# Patient Record
Sex: MALE | Race: WHITE | NOT HISPANIC OR LATINO
[De-identification: names, ages, dates, MRNs, and addresses within clinical notes are randomized per-mention and may not be internally consistent; named-entity substitution may affect disease eponyms.]

---

## 2017-01-23 ENCOUNTER — APPOINTMENT (OUTPATIENT)
Dept: VASCULAR SURGERY | Facility: CLINIC | Age: 82
End: 2017-01-23

## 2017-01-23 VITALS
DIASTOLIC BLOOD PRESSURE: 66 MMHG | TEMPERATURE: 98 F | HEART RATE: 68 BPM | SYSTOLIC BLOOD PRESSURE: 103 MMHG | HEIGHT: 67 IN | BODY MASS INDEX: 28.25 KG/M2 | WEIGHT: 180 LBS

## 2017-03-30 ENCOUNTER — APPOINTMENT (OUTPATIENT)
Dept: PULMONOLOGY | Facility: CLINIC | Age: 82
End: 2017-03-30

## 2017-03-30 VITALS
RESPIRATION RATE: 17 BRPM | BODY MASS INDEX: 28.25 KG/M2 | DIASTOLIC BLOOD PRESSURE: 70 MMHG | HEIGHT: 67 IN | WEIGHT: 180 LBS | HEART RATE: 89 BPM | SYSTOLIC BLOOD PRESSURE: 110 MMHG | OXYGEN SATURATION: 97 %

## 2017-03-30 DIAGNOSIS — Z86.711 PERSONAL HISTORY OF PULMONARY EMBOLISM: ICD-10-CM

## 2017-03-30 RX ORDER — UMECLIDINIUM 62.5 UG/1
62.5 AEROSOL, POWDER ORAL
Qty: 3 | Refills: 1 | Status: ACTIVE | COMMUNITY
Start: 2017-03-30 | End: 1900-01-01

## 2017-03-30 RX ORDER — BUDESONIDE 0.5 MG/2ML
0.5 INHALANT ORAL TWICE DAILY
Qty: 60 | Refills: 4 | Status: ACTIVE | COMMUNITY
Start: 2017-03-30 | End: 1900-01-01

## 2017-03-30 RX ORDER — IPRATROPIUM BROMIDE AND ALBUTEROL SULFATE 2.5; .5 MG/3ML; MG/3ML
0.5-2.5 (3) SOLUTION RESPIRATORY (INHALATION) 4 TIMES DAILY
Qty: 360 | Refills: 2 | Status: ACTIVE | COMMUNITY
Start: 2017-03-30 | End: 1900-01-01

## 2017-05-16 ENCOUNTER — APPOINTMENT (OUTPATIENT)
Dept: SURGICAL ONCOLOGY | Facility: CLINIC | Age: 82
End: 2017-05-16

## 2017-05-16 VITALS — DIASTOLIC BLOOD PRESSURE: 76 MMHG | SYSTOLIC BLOOD PRESSURE: 114 MMHG | HEIGHT: 67 IN

## 2017-05-16 DIAGNOSIS — Z87.01 PERSONAL HISTORY OF PNEUMONIA (RECURRENT): ICD-10-CM

## 2017-05-16 DIAGNOSIS — Z86.79 PERSONAL HISTORY OF OTHER DISEASES OF THE CIRCULATORY SYSTEM: ICD-10-CM

## 2017-05-16 DIAGNOSIS — Z87.448 PERSONAL HISTORY OF OTHER DISEASES OF URINARY SYSTEM: ICD-10-CM

## 2017-05-25 ENCOUNTER — NON-APPOINTMENT (OUTPATIENT)
Age: 82
End: 2017-05-25

## 2017-05-25 ENCOUNTER — APPOINTMENT (OUTPATIENT)
Dept: CARDIOLOGY | Facility: CLINIC | Age: 82
End: 2017-05-25

## 2017-05-25 VITALS — HEART RATE: 80 BPM | DIASTOLIC BLOOD PRESSURE: 81 MMHG | SYSTOLIC BLOOD PRESSURE: 126 MMHG

## 2017-05-26 ENCOUNTER — RESULT REVIEW (OUTPATIENT)
Age: 82
End: 2017-05-26

## 2017-05-26 ENCOUNTER — OUTPATIENT (OUTPATIENT)
Dept: OUTPATIENT SERVICES | Facility: HOSPITAL | Age: 82
LOS: 1 days | End: 2017-05-26
Payer: MEDICARE

## 2017-05-26 DIAGNOSIS — C44.622 SQUAMOUS CELL CARCINOMA OF SKIN OF RIGHT UPPER LIMB, INCLUDING SHOULDER: ICD-10-CM

## 2017-05-26 PROCEDURE — 88321 CONSLTJ&REPRT SLD PREP ELSWR: CPT

## 2017-05-27 LAB — SURGICAL PATHOLOGY STUDY: SIGNIFICANT CHANGE UP

## 2017-05-30 ENCOUNTER — APPOINTMENT (OUTPATIENT)
Dept: VASCULAR SURGERY | Facility: CLINIC | Age: 82
End: 2017-05-30

## 2017-05-30 VITALS
HEIGHT: 67 IN | BODY MASS INDEX: 27.47 KG/M2 | DIASTOLIC BLOOD PRESSURE: 64 MMHG | WEIGHT: 175 LBS | TEMPERATURE: 98.3 F | SYSTOLIC BLOOD PRESSURE: 94 MMHG | HEART RATE: 76 BPM

## 2017-05-31 ENCOUNTER — FORM ENCOUNTER (OUTPATIENT)
Age: 82
End: 2017-05-31

## 2017-06-01 ENCOUNTER — OUTPATIENT (OUTPATIENT)
Dept: OUTPATIENT SERVICES | Facility: HOSPITAL | Age: 82
LOS: 1 days | End: 2017-06-01
Payer: MEDICARE

## 2017-06-01 ENCOUNTER — APPOINTMENT (OUTPATIENT)
Dept: CV DIAGNOSITCS | Facility: HOSPITAL | Age: 82
End: 2017-06-01

## 2017-06-01 VITALS
TEMPERATURE: 98 F | HEIGHT: 67 IN | OXYGEN SATURATION: 97 % | RESPIRATION RATE: 16 BRPM | SYSTOLIC BLOOD PRESSURE: 130 MMHG | WEIGHT: 173.94 LBS | HEART RATE: 84 BPM | DIASTOLIC BLOOD PRESSURE: 76 MMHG

## 2017-06-01 DIAGNOSIS — I77.0 ARTERIOVENOUS FISTULA, ACQUIRED: Chronic | ICD-10-CM

## 2017-06-01 DIAGNOSIS — Z01.818 ENCOUNTER FOR OTHER PREPROCEDURAL EXAMINATION: ICD-10-CM

## 2017-06-01 DIAGNOSIS — I42.9 CARDIOMYOPATHY, UNSPECIFIED: ICD-10-CM

## 2017-06-01 DIAGNOSIS — I48.91 UNSPECIFIED ATRIAL FIBRILLATION: ICD-10-CM

## 2017-06-01 DIAGNOSIS — C44.621 SQUAMOUS CELL CARCINOMA OF SKIN OF UNSPECIFIED UPPER LIMB, INCLUDING SHOULDER: ICD-10-CM

## 2017-06-01 DIAGNOSIS — C44.320 SQUAMOUS CELL CARCINOMA OF SKIN OF UNSPECIFIED PARTS OF FACE: ICD-10-CM

## 2017-06-01 DIAGNOSIS — R00.2 PALPITATIONS: ICD-10-CM

## 2017-06-01 DIAGNOSIS — C44.90 UNSPECIFIED MALIGNANT NEOPLASM OF SKIN, UNSPECIFIED: ICD-10-CM

## 2017-06-01 LAB
ANION GAP SERPL CALC-SCNC: 18 MMOL/L — HIGH (ref 5–17)
BUN SERPL-MCNC: 57 MG/DL — HIGH (ref 7–23)
CALCIUM SERPL-MCNC: 9.7 MG/DL — SIGNIFICANT CHANGE UP (ref 8.4–10.5)
CHLORIDE SERPL-SCNC: 96 MMOL/L — SIGNIFICANT CHANGE UP (ref 96–108)
CO2 SERPL-SCNC: 26 MMOL/L — SIGNIFICANT CHANGE UP (ref 22–31)
CREAT SERPL-MCNC: 4.17 MG/DL — HIGH (ref 0.5–1.3)
GLUCOSE SERPL-MCNC: 109 MG/DL — HIGH (ref 70–99)
HCT VFR BLD CALC: 35.4 % — LOW (ref 39–50)
HGB BLD-MCNC: 11.2 G/DL — LOW (ref 13–17)
INR BLD: 2.06 RATIO — HIGH (ref 0.88–1.16)
MCHC RBC-ENTMCNC: 31.2 PG — SIGNIFICANT CHANGE UP (ref 27–34)
MCHC RBC-ENTMCNC: 31.6 GM/DL — LOW (ref 32–36)
MCV RBC AUTO: 98.6 FL — SIGNIFICANT CHANGE UP (ref 80–100)
PLATELET # BLD AUTO: 211 K/UL — SIGNIFICANT CHANGE UP (ref 150–400)
POTASSIUM SERPL-MCNC: 3.8 MMOL/L — SIGNIFICANT CHANGE UP (ref 3.5–5.3)
POTASSIUM SERPL-SCNC: 3.8 MMOL/L — SIGNIFICANT CHANGE UP (ref 3.5–5.3)
PROTHROM AB SERPL-ACNC: 23.6 SEC — HIGH (ref 10–13.1)
RBC # BLD: 3.59 M/UL — LOW (ref 4.2–5.8)
RBC # FLD: 17.8 % — HIGH (ref 10.3–14.5)
SODIUM SERPL-SCNC: 140 MMOL/L — SIGNIFICANT CHANGE UP (ref 135–145)
WBC # BLD: 5.8 K/UL — SIGNIFICANT CHANGE UP (ref 3.8–10.5)
WBC # FLD AUTO: 5.8 K/UL — SIGNIFICANT CHANGE UP (ref 3.8–10.5)

## 2017-06-01 PROCEDURE — 71046 X-RAY EXAM CHEST 2 VIEWS: CPT

## 2017-06-01 PROCEDURE — G0463: CPT

## 2017-06-01 PROCEDURE — 93306 TTE W/DOPPLER COMPLETE: CPT | Mod: 26

## 2017-06-01 PROCEDURE — 71020: CPT | Mod: 26

## 2017-06-01 PROCEDURE — 80048 BASIC METABOLIC PNL TOTAL CA: CPT

## 2017-06-01 PROCEDURE — 85610 PROTHROMBIN TIME: CPT

## 2017-06-01 PROCEDURE — 93306 TTE W/DOPPLER COMPLETE: CPT

## 2017-06-01 PROCEDURE — 85027 COMPLETE CBC AUTOMATED: CPT

## 2017-06-01 NOTE — H&P PST ADULT - HISTORY OF PRESENT ILLNESS
NIGHT HOSPITALIST:    Patient UNKNOWN to me previously, assigned to me via the ER and by Dr. Wang to admit this 80 y/o M--patient seen with granddaughter and family in attendance--patient with a history of chronic kidney disease, heart failure with reduced ejection fraction, with recent discharge from [facility], S/P bladder clot evacuation by Dr. Zimmer with the last admission, with the patient brought in by EMS from the SNF following progressive increased swelling of the LEFT>right LE and decreased functional status at rehab.  No chest pain/pressure.  No dyspnea.  Patient with non-productive cough.  No fever, no chills, no rigors.  No abdominal pain, no red blood per rectum or melena.  No back pain, no tearing back pain.  No anorexia.  No HA, no focal weakness.  No hematuria, no dysuria.  Remaining review of systems not contributory. 82 year old male with PMH of Afib on warfarin, DVT,PE, IVC filter placed, Non ischemic cardiomyopathy, CHF;systolic diastolic dysfunction, ESRD on HD 3X/week M/W/F, COPD, Asbestosis & SCC of fore head> scheduled for wide excision on 06/13/17.

## 2017-06-01 NOTE — H&P PST ADULT - PROBLEM SELECTOR PLAN 1
Lasix 40 mg IVP daily as above--additional doses as needed by patient's symptoms.  Daily weights.  Admitted to telemetry.  Serial cardiac enzymes.  Echo.  Heart failure service consultation in the AM.  On Coreg. WIDE EXCISION SQUAMOUS CELL CARCINOMA RIGHT & LEFT FORHEAD

## 2017-06-01 NOTE — H&P PST ADULT - PMH
Afib    Asthma    Cardiomyopathy    Chronic obstructive pulmonary disease    DVT (deep venous thrombosis)    Dyspnea    Former smoker    Obstructive sleep apnea    Pulmonary embolism    SDH (subdural hematoma) Afib    Asthma    Cardiomyopathy    Chronic obstructive pulmonary disease    DVT (deep venous thrombosis)    Dyspnea    Former smoker    Obstructive sleep apnea    Pulmonary embolism    SDH (subdural hematoma)    Skin cancer

## 2017-06-01 NOTE — H&P PST ADULT - NSANTHOSAYNRD_GEN_A_CORE
No. DEEPALI screening performed.  STOP BANG Legend: 0-2 = LOW Risk; 3-4 = INTERMEDIATE Risk; 5-8 = HIGH Risk

## 2017-06-01 NOTE — H&P PST ADULT - PSH
Afib    H/O right inguinal hernia repair    PE (pulmonary embolism)    Presence of IVC filter    S/P nasal septoplasty Afib    AVF (arteriovenous fistula)  right AV fistula  H/O right inguinal hernia repair    PE (pulmonary embolism)    Presence of IVC filter    S/P nasal septoplasty

## 2017-06-01 NOTE — H&P PST ADULT - PROBLEM SELECTOR PLAN 3
case discussed with Dr chairez & Dr Cornelius-Case need to done in [facility]  Informed surgeo's office to schedule in [facility](Ms Whelan)

## 2017-06-01 NOTE — H&P PST ADULT - PROBLEM SELECTOR PLAN 2
Coumadin- pre op plan as per cardiologist-  Patient & his home nurse" will confirm it with the cardiologist"

## 2017-06-02 ENCOUNTER — OUTPATIENT (OUTPATIENT)
Dept: OUTPATIENT SERVICES | Facility: HOSPITAL | Age: 82
LOS: 1 days | End: 2017-06-02
Payer: MEDICARE

## 2017-06-02 ENCOUNTER — APPOINTMENT (OUTPATIENT)
Dept: ULTRASOUND IMAGING | Facility: CLINIC | Age: 82
End: 2017-06-02

## 2017-06-02 DIAGNOSIS — Z00.8 ENCOUNTER FOR OTHER GENERAL EXAMINATION: ICD-10-CM

## 2017-06-02 DIAGNOSIS — I77.0 ARTERIOVENOUS FISTULA, ACQUIRED: Chronic | ICD-10-CM

## 2017-06-02 PROCEDURE — 76775 US EXAM ABDO BACK WALL LIM: CPT

## 2017-06-05 ENCOUNTER — APPOINTMENT (OUTPATIENT)
Dept: VASCULAR SURGERY | Facility: CLINIC | Age: 82
End: 2017-06-05

## 2017-06-12 NOTE — ASU PATIENT PROFILE, ADULT - PSH
Afib    AVF (arteriovenous fistula)  right AV fistula  H/O right inguinal hernia repair    PE (pulmonary embolism)    Presence of IVC filter    S/P nasal septoplasty

## 2017-06-12 NOTE — ASU PATIENT PROFILE, ADULT - PMH
Afib    Asthma    Cardiomyopathy    Chronic obstructive pulmonary disease    DVT (deep venous thrombosis)    Dyspnea    Former smoker    Obstructive sleep apnea    Pulmonary embolism    SDH (subdural hematoma)    Skin cancer

## 2017-06-13 ENCOUNTER — OUTPATIENT (OUTPATIENT)
Dept: OUTPATIENT SERVICES | Facility: HOSPITAL | Age: 82
LOS: 1 days | Discharge: HOME | End: 2017-06-13
Payer: MEDICARE

## 2017-06-13 ENCOUNTER — RESULT REVIEW (OUTPATIENT)
Age: 82
End: 2017-06-13

## 2017-06-13 ENCOUNTER — APPOINTMENT (OUTPATIENT)
Dept: SURGICAL ONCOLOGY | Facility: HOSPITAL | Age: 82
End: 2017-06-13

## 2017-06-13 VITALS
HEIGHT: 67 IN | HEART RATE: 90 BPM | TEMPERATURE: 98 F | SYSTOLIC BLOOD PRESSURE: 102 MMHG | RESPIRATION RATE: 16 BRPM | DIASTOLIC BLOOD PRESSURE: 69 MMHG | OXYGEN SATURATION: 96 % | WEIGHT: 173.94 LBS

## 2017-06-13 VITALS
RESPIRATION RATE: 16 BRPM | DIASTOLIC BLOOD PRESSURE: 60 MMHG | SYSTOLIC BLOOD PRESSURE: 107 MMHG | OXYGEN SATURATION: 98 % | HEART RATE: 72 BPM

## 2017-06-13 DIAGNOSIS — C44.629 SQUAMOUS CELL CARCINOMA OF SKIN OF LEFT UPPER LIMB, INCLUDING SHOULDER: ICD-10-CM

## 2017-06-13 DIAGNOSIS — I77.0 ARTERIOVENOUS FISTULA, ACQUIRED: Chronic | ICD-10-CM

## 2017-06-13 LAB
APTT BLD: 38 SEC — HIGH (ref 27.5–37.4)
BASE EXCESS BLDA CALC-SCNC: 0.5 MMOL/L — SIGNIFICANT CHANGE UP
CA-I BLDA-SCNC: 1.18 MMOL/L — SIGNIFICANT CHANGE UP (ref 1.15–1.29)
GLUCOSE BLDA-MCNC: 101 MG/DL — HIGH (ref 70–99)
HCO3 BLDA-SCNC: 25 MMOL/L — SIGNIFICANT CHANGE UP (ref 22–26)
HCT VFR BLDA CALC: 34.1 % — LOW (ref 39–51)
HGB BLDA-MCNC: 11.1 G/DL — LOW (ref 13–17)
INR BLD: 1.15 — SIGNIFICANT CHANGE UP (ref 0.88–1.17)
PCO2 BLDA: 38 MMHG — SIGNIFICANT CHANGE UP (ref 35–48)
PH BLDA: 7.42 PH — SIGNIFICANT CHANGE UP (ref 7.35–7.45)
PO2 BLDA: 367 MMHG — HIGH (ref 83–108)
POTASSIUM BLDA-SCNC: 4 MMOL/L — SIGNIFICANT CHANGE UP (ref 3.4–4.5)
POTASSIUM BLDV-SCNC: 3.9 MMOL/L — SIGNIFICANT CHANGE UP (ref 3.4–4.5)
PROTHROM AB SERPL-ACNC: 12.9 SEC — SIGNIFICANT CHANGE UP (ref 9.8–13.1)
SAO2 % BLDA: 100 % — HIGH (ref 95–99)
SODIUM BLDA-SCNC: 140 MMOL/L — SIGNIFICANT CHANGE UP (ref 136–146)

## 2017-06-13 PROCEDURE — 88331 PATH CONSLTJ SURG 1 BLK 1SPC: CPT | Mod: 26

## 2017-06-13 PROCEDURE — 88305 TISSUE EXAM BY PATHOLOGIST: CPT | Mod: 26

## 2017-06-13 PROCEDURE — 88332 PATH CONSLTJ SURG EA ADD BLK: CPT | Mod: 26

## 2017-06-13 RX ORDER — SODIUM CHLORIDE 9 MG/ML
1000 INJECTION, SOLUTION INTRAVENOUS
Qty: 0 | Refills: 0 | Status: DISCONTINUED | OUTPATIENT
Start: 2017-06-13 | End: 2017-06-28

## 2017-06-13 NOTE — ASU DISCHARGE PLAN (ADULT/PEDIATRIC). - ITEMS TO FOLLOWUP WITH YOUR PHYSICIAN'S
1. Please follow-up with plastic surgeon Dr. Hamilton in 1 week.  Please call his office at [phone] to make an appointment.  2. Please follow-up with your surgeon Dr. Jerez in 10 days.  Please call his office to make an appointment

## 2017-06-13 NOTE — ASU DISCHARGE PLAN (ADULT/PEDIATRIC). - MEDICATION SUMMARY - MEDICATIONS TO TAKE
I will START or STAY ON the medications listed below when I get home from the hospital:    finasteride 5 mg oral tablet  -- 1 tab(s) by mouth once a day  -- Indication: For BPH    budesonide 0.5 mg/2 mL inhalation suspension  -- 2 milliliter(s) inhaled 2 times a day  -- Indication: For asthma    Coumadin 5 mg oral tablet  -- 1 tab(s) by mouth once a day in evening  -- Do not take this drug if you are pregnant.  It is very important that you take or use this exactly as directed.  Do not skip doses or discontinue unless directed by your doctor.  Obtain medical advice before taking any non-prescription drugs as some may affect the action of this medication.    -- Indication: For PE    Toprol-XL 25 mg oral tablet, extended release  -- 0.5 tab(s) = 12.5 mg  by mouth once a day  -- It is very important that you take or use this exactly as directed.  Do not skip doses or discontinue unless directed by your doctor.  May cause drowsiness.  Alcohol may intensify this effect.  Use care when operating dangerous machinery.  Some non-prescription drugs may aggravate your condition.  Read all labels carefully.  If a warning appears, check with your doctor before taking.  Swallow whole.  Do not crush.  Take with food or milk.  This drug may impair the ability to drive or operate machinery.  Use care until you become familiar with its effects.    -- Indication: For a-fib    albuterol-ipratropium 2.5 mg-0.5 mg/3 mL inhalation solution  -- 3 milliliter(s) inhaled every 6 hours  -- Indication: For asthma    Spiriva 18 mcg inhalation capsule  -- 1 cap(s) inhaled once a day  -- Indication: For asthma    epoetin chong  -- 61674 unit(s) intravenous 3 times a week with dialysis on T/Th/Sat  -- Indication: For dialysis    montelukast 10 mg oral tablet  -- 1 tab(s) by mouth once a day (in the evening)  -- Indication: For asthma    sevelamer hydrochloride 800 mg oral tablet  -- 2 tab(s) = 1600 mg by mouth 3 times a day (with meals)  -- Indication: For home medication I will START or STAY ON the medications listed below when I get home from the hospital:    finasteride 5 mg oral tablet  -- 1 tab(s) by mouth once a day  -- Indication: For BPH    budesonide 0.5 mg/2 mL inhalation suspension  -- 2 milliliter(s) inhaled 2 times a day  -- Indication: For COPD    Coumadin 5 mg oral tablet  -- 1 tab(s) by mouth once a day in evening  -- Do not take this drug if you are pregnant.  It is very important that you take or use this exactly as directed.  Do not skip doses or discontinue unless directed by your doctor.  Obtain medical advice before taking any non-prescription drugs as some may affect the action of this medication.    -- Indication: For PE    Toprol-XL 25 mg oral tablet, extended release  -- 0.5 tab(s) = 12.5 mg  by mouth once a day  -- It is very important that you take or use this exactly as directed.  Do not skip doses or discontinue unless directed by your doctor.  May cause drowsiness.  Alcohol may intensify this effect.  Use care when operating dangerous machinery.  Some non-prescription drugs may aggravate your condition.  Read all labels carefully.  If a warning appears, check with your doctor before taking.  Swallow whole.  Do not crush.  Take with food or milk.  This drug may impair the ability to drive or operate machinery.  Use care until you become familiar with its effects.    -- Indication: For a-fib    albuterol-ipratropium 2.5 mg-0.5 mg/3 mL inhalation solution  -- 3 milliliter(s) inhaled every 6 hours  -- Indication: For COPD    Spiriva 18 mcg inhalation capsule  -- 1 cap(s) inhaled once a day  -- Indication: For COPD    epoetin chong  -- 42981 unit(s) intravenous 3 times a week with dialysis on T/Th/Sat  -- Indication: For dialysis    montelukast 10 mg oral tablet  -- 1 tab(s) by mouth once a day (in the evening)  -- Indication: For COPD    sevelamer hydrochloride 800 mg oral tablet  -- 2 tab(s) = 1600 mg by mouth 3 times a day (with meals)  -- Indication: For home medication

## 2017-06-13 NOTE — BRIEF OPERATIVE NOTE - OPERATION/FINDINGS
PROCEDURE  Solitario:  Wide local excision of Left forehead, Right forehead, and Right forearm skin cancer  Hamilton:  Adjacent tissue transfer, coverage of Right forearm and Left forehead wound.  Full thickness skin graft coverage of Right forehead wound with bolster dressing

## 2017-06-13 NOTE — BRIEF OPERATIVE NOTE - SPECIMENS
1. SCC right arm 2. SCC left forehead 3. SCC right forehead 4.Right forearm true superior margin 5. Right forearm true lateral margin 6. Right forearm true inferior margin 7. Left forehead true medial margin 8. Left forehead true lateral margin 9. True 3olock margin right forearm 10. True 9oclock margin Right forehead

## 2017-06-13 NOTE — ASU DISCHARGE PLAN (ADULT/PEDIATRIC). - NOTIFY
Persistent Nausea and Vomiting/Inability to Tolerate Liquids or Foods/Fever greater than 101/Pain not relieved by Medications/Bleeding that does not stop/Swelling that continues/Numbness, color, or temperature change to extremity/Unable to Urinate/Excessive Diarrhea

## 2017-06-13 NOTE — ASU DISCHARGE PLAN (ADULT/PEDIATRIC). - SPECIAL INSTRUCTIONS
1. ***Please keep all of your dressings Clean, Dry, and Intact.  2.  You were prescribed an antibiotic and pain medication by Dr. Hamilton.  Please take medication as prescribed.  3. You may resume your home dose of Coumadin tonight 6/13/17.

## 2017-06-13 NOTE — BRIEF OPERATIVE NOTE - POST-OP DX
Skin cancer of arm, right  06/13/2017    Active  Juan Diego Sky  Skin cancer of forehead  06/13/2017    Active  Juan Diego Sky

## 2017-06-13 NOTE — ASU DISCHARGE PLAN (ADULT/PEDIATRIC). - ACTIVITY LEVEL
No heavy lifting or strenuous activity until re-evaluated at follow-up appointment.   Otherwise, you may gradually resume your previous level of activity as tolerated

## 2017-06-16 ENCOUNTER — TRANSCRIPTION ENCOUNTER (OUTPATIENT)
Age: 82
End: 2017-06-16

## 2017-06-18 ENCOUNTER — TRANSCRIPTION ENCOUNTER (OUTPATIENT)
Age: 82
End: 2017-06-18

## 2017-06-21 LAB — SURGICAL PATHOLOGY STUDY: SIGNIFICANT CHANGE UP

## 2017-06-22 ENCOUNTER — TRANSCRIPTION ENCOUNTER (OUTPATIENT)
Age: 82
End: 2017-06-22

## 2017-06-27 ENCOUNTER — APPOINTMENT (OUTPATIENT)
Dept: PULMONOLOGY | Facility: CLINIC | Age: 82
End: 2017-06-27

## 2017-06-27 VITALS
HEIGHT: 67 IN | SYSTOLIC BLOOD PRESSURE: 110 MMHG | HEART RATE: 106 BPM | RESPIRATION RATE: 16 BRPM | WEIGHT: 175 LBS | DIASTOLIC BLOOD PRESSURE: 60 MMHG | BODY MASS INDEX: 27.47 KG/M2 | OXYGEN SATURATION: 93 %

## 2017-06-28 ENCOUNTER — MEDICATION RENEWAL (OUTPATIENT)
Age: 82
End: 2017-06-28

## 2017-06-28 RX ORDER — PREDNISONE 10 MG/1
10 TABLET ORAL
Qty: 1 | Refills: 0 | Status: ACTIVE | COMMUNITY
Start: 2017-06-27 | End: 1900-01-01

## 2017-06-28 RX ORDER — MIRTAZAPINE 15 MG/1
15 TABLET, FILM COATED ORAL
Qty: 90 | Refills: 5 | Status: ACTIVE | COMMUNITY
Start: 2017-06-27 | End: 1900-01-01

## 2017-06-29 ENCOUNTER — APPOINTMENT (OUTPATIENT)
Dept: SURGICAL ONCOLOGY | Facility: CLINIC | Age: 82
End: 2017-06-29

## 2017-06-29 VITALS
RESPIRATION RATE: 16 BRPM | WEIGHT: 175 LBS | TEMPERATURE: 97.6 F | OXYGEN SATURATION: 94 % | HEART RATE: 97 BPM | BODY MASS INDEX: 27.47 KG/M2 | SYSTOLIC BLOOD PRESSURE: 130 MMHG | HEIGHT: 67 IN | DIASTOLIC BLOOD PRESSURE: 81 MMHG

## 2017-07-31 ENCOUNTER — OUTPATIENT (OUTPATIENT)
Dept: OUTPATIENT SERVICES | Facility: HOSPITAL | Age: 82
LOS: 1 days | Discharge: HOME | End: 2017-07-31

## 2017-07-31 DIAGNOSIS — I77.0 ARTERIOVENOUS FISTULA, ACQUIRED: Chronic | ICD-10-CM

## 2017-08-01 ENCOUNTER — APPOINTMENT (OUTPATIENT)
Dept: RADIATION ONCOLOGY | Facility: CLINIC | Age: 82
End: 2017-08-01
Payer: MEDICARE

## 2017-08-01 VITALS
TEMPERATURE: 98.2 F | BODY MASS INDEX: 28.15 KG/M2 | RESPIRATION RATE: 16 BRPM | WEIGHT: 179.35 LBS | DIASTOLIC BLOOD PRESSURE: 77 MMHG | OXYGEN SATURATION: 95 % | HEIGHT: 67 IN | SYSTOLIC BLOOD PRESSURE: 118 MMHG | HEART RATE: 84 BPM

## 2017-08-01 PROCEDURE — 99204 OFFICE O/P NEW MOD 45 MIN: CPT

## 2017-08-01 RX ORDER — MOXIFLOXACIN HYDROCHLORIDE TABLETS, 400 MG 400 MG/1
400 TABLET, FILM COATED ORAL DAILY
Qty: 10 | Refills: 0 | Status: COMPLETED | COMMUNITY
Start: 2017-06-27 | End: 2017-08-01

## 2017-08-01 RX ORDER — METOPROLOL TARTRATE 25 MG/1
25 TABLET, FILM COATED ORAL
Refills: 0 | Status: ACTIVE | COMMUNITY

## 2017-08-01 RX ORDER — FLUTICASONE FUROATE AND VILANTEROL TRIFENATATE 50; 25 UG/1; UG/1
POWDER RESPIRATORY (INHALATION)
Refills: 0 | Status: ACTIVE | COMMUNITY

## 2017-08-01 RX ORDER — SEVELAMER HCL 403 MG
CAPSULE ORAL
Refills: 0 | Status: ACTIVE | COMMUNITY

## 2017-08-01 RX ORDER — FINASTERIDE 5 MG/1
5 TABLET, FILM COATED ORAL
Refills: 0 | Status: ACTIVE | COMMUNITY

## 2017-08-07 ENCOUNTER — APPOINTMENT (OUTPATIENT)
Dept: PULMONOLOGY | Facility: CLINIC | Age: 82
End: 2017-08-07
Payer: MEDICARE

## 2017-08-07 VITALS
HEIGHT: 67 IN | SYSTOLIC BLOOD PRESSURE: 106 MMHG | OXYGEN SATURATION: 92 % | WEIGHT: 182 LBS | HEART RATE: 79 BPM | RESPIRATION RATE: 17 BRPM | DIASTOLIC BLOOD PRESSURE: 74 MMHG | BODY MASS INDEX: 28.56 KG/M2

## 2017-08-07 DIAGNOSIS — R05 COUGH: ICD-10-CM

## 2017-08-07 PROCEDURE — 99214 OFFICE O/P EST MOD 30 MIN: CPT | Mod: 25

## 2017-08-07 PROCEDURE — 99070 SPECIAL SUPPLIES PHYS/QHP: CPT

## 2017-08-07 PROCEDURE — 71020: CPT

## 2017-08-07 RX ORDER — KETOCONAZOLE 20 MG/G
2 CREAM TOPICAL
Qty: 60 | Refills: 0 | Status: ACTIVE | COMMUNITY
Start: 2017-04-06

## 2017-08-07 RX ORDER — DOCUSATE SODIUM 100 MG/1
100 CAPSULE, LIQUID FILLED ORAL
Qty: 30 | Refills: 0 | Status: ACTIVE | COMMUNITY
Start: 2017-06-05

## 2017-08-07 RX ORDER — METOPROLOL SUCCINATE 25 MG/1
25 TABLET, EXTENDED RELEASE ORAL
Qty: 30 | Refills: 0 | Status: ACTIVE | COMMUNITY
Start: 2017-06-06

## 2017-08-07 RX ORDER — IPRATROPIUM BROMIDE AND ALBUTEROL SULFATE 2.5; .5 MG/3ML; MG/3ML
0.5-2.5 (3) SOLUTION RESPIRATORY (INHALATION) 4 TIMES DAILY
Qty: 120 | Refills: 2 | Status: ACTIVE | COMMUNITY
Start: 2017-08-07 | End: 1900-01-01

## 2017-08-07 RX ORDER — ACETAMINOPHEN AND CODEINE 300; 30 MG/1; MG/1
300-30 TABLET ORAL
Qty: 30 | Refills: 0 | Status: ACTIVE | COMMUNITY
Start: 2017-06-05

## 2017-08-07 RX ORDER — CLINDAMYCIN HYDROCHLORIDE 300 MG/1
300 CAPSULE ORAL
Qty: 14 | Refills: 0 | Status: DISCONTINUED | COMMUNITY
Start: 2017-06-05 | End: 2017-08-07

## 2017-08-07 RX ORDER — AMMONIUM LACTATE 12 %
12 CREAM (GRAM) TOPICAL
Qty: 140 | Refills: 0 | Status: ACTIVE | COMMUNITY
Start: 2017-04-06

## 2017-08-07 RX ORDER — BUDESONIDE 0.5 MG/2ML
0.5 INHALANT ORAL
Qty: 60 | Refills: 5 | Status: ACTIVE | COMMUNITY
Start: 2017-08-07 | End: 1900-01-01

## 2017-08-09 ENCOUNTER — FORM ENCOUNTER (OUTPATIENT)
Age: 82
End: 2017-08-09

## 2017-08-10 ENCOUNTER — OUTPATIENT (OUTPATIENT)
Dept: OUTPATIENT SERVICES | Facility: HOSPITAL | Age: 82
LOS: 1 days | End: 2017-08-10
Payer: MEDICARE

## 2017-08-10 ENCOUNTER — APPOINTMENT (OUTPATIENT)
Dept: CT IMAGING | Facility: IMAGING CENTER | Age: 82
End: 2017-08-10
Payer: MEDICARE

## 2017-08-10 ENCOUNTER — OTHER (OUTPATIENT)
Age: 82
End: 2017-08-10

## 2017-08-10 DIAGNOSIS — I77.0 ARTERIOVENOUS FISTULA, ACQUIRED: Chronic | ICD-10-CM

## 2017-08-10 DIAGNOSIS — J39.8 OTHER SPECIFIED DISEASES OF UPPER RESPIRATORY TRACT: ICD-10-CM

## 2017-08-10 PROCEDURE — 71250 CT THORAX DX C-: CPT

## 2017-08-10 PROCEDURE — 71250 CT THORAX DX C-: CPT | Mod: 26

## 2017-08-11 RX ORDER — MOXIFLOXACIN HYDROCHLORIDE TABLETS, 400 MG 400 MG/1
400 TABLET, FILM COATED ORAL DAILY
Qty: 10 | Refills: 0 | Status: ACTIVE | COMMUNITY
Start: 2017-08-11 | End: 1900-01-01

## 2017-08-15 PROCEDURE — 77263 THER RADIOLOGY TX PLNG CPLX: CPT

## 2017-08-15 PROCEDURE — 77334 RADIATION TREATMENT AID(S): CPT | Mod: 26

## 2017-08-15 PROCEDURE — 77321 SPECIAL TELETX PORT PLAN: CPT | Mod: 26

## 2017-08-16 PROCEDURE — 77334 RADIATION TREATMENT AID(S): CPT | Mod: 26

## 2017-08-17 ENCOUNTER — OTHER (OUTPATIENT)
Age: 82
End: 2017-08-17

## 2017-08-17 VITALS
SYSTOLIC BLOOD PRESSURE: 140 MMHG | WEIGHT: 191.8 LBS | HEART RATE: 82 BPM | OXYGEN SATURATION: 96 % | RESPIRATION RATE: 17 BRPM | DIASTOLIC BLOOD PRESSURE: 90 MMHG | HEIGHT: 67 IN | BODY MASS INDEX: 30.1 KG/M2

## 2017-08-17 PROCEDURE — 77280 THER RAD SIMULAJ FIELD SMPL: CPT | Mod: 26

## 2017-08-21 PROCEDURE — 77427 RADIATION TX MANAGEMENT X5: CPT

## 2017-08-22 ENCOUNTER — OTHER (OUTPATIENT)
Age: 82
End: 2017-08-22

## 2017-08-31 ENCOUNTER — OTHER (OUTPATIENT)
Age: 82
End: 2017-08-31

## 2017-09-01 PROCEDURE — 77427 RADIATION TX MANAGEMENT X5: CPT

## 2017-09-05 ENCOUNTER — OTHER (OUTPATIENT)
Age: 82
End: 2017-09-05

## 2017-09-05 VITALS
RESPIRATION RATE: 16 BRPM | SYSTOLIC BLOOD PRESSURE: 117 MMHG | BODY MASS INDEX: 27.47 KG/M2 | DIASTOLIC BLOOD PRESSURE: 76 MMHG | WEIGHT: 175 LBS | OXYGEN SATURATION: 92 % | HEART RATE: 68 BPM | HEIGHT: 67 IN

## 2017-09-11 VITALS
RESPIRATION RATE: 16 BRPM | DIASTOLIC BLOOD PRESSURE: 81 MMHG | SYSTOLIC BLOOD PRESSURE: 116 MMHG | BODY MASS INDEX: 27.41 KG/M2 | OXYGEN SATURATION: 97 % | WEIGHT: 175 LBS | HEART RATE: 81 BPM

## 2017-09-11 PROCEDURE — 77427 RADIATION TX MANAGEMENT X5: CPT

## 2017-09-18 VITALS
RESPIRATION RATE: 16 BRPM | DIASTOLIC BLOOD PRESSURE: 75 MMHG | WEIGHT: 175 LBS | SYSTOLIC BLOOD PRESSURE: 116 MMHG | OXYGEN SATURATION: 98 % | HEART RATE: 78 BPM | BODY MASS INDEX: 27.41 KG/M2

## 2017-09-19 ENCOUNTER — APPOINTMENT (OUTPATIENT)
Dept: CARDIOLOGY | Facility: CLINIC | Age: 82
End: 2017-09-19

## 2017-09-26 ENCOUNTER — APPOINTMENT (OUTPATIENT)
Dept: CARDIOLOGY | Facility: CLINIC | Age: 82
End: 2017-09-26
Payer: MEDICARE

## 2017-09-26 ENCOUNTER — NON-APPOINTMENT (OUTPATIENT)
Age: 82
End: 2017-09-26

## 2017-09-26 VITALS
BODY MASS INDEX: 28.72 KG/M2 | SYSTOLIC BLOOD PRESSURE: 108 MMHG | OXYGEN SATURATION: 97 % | WEIGHT: 183 LBS | HEART RATE: 81 BPM | DIASTOLIC BLOOD PRESSURE: 67 MMHG | HEIGHT: 67 IN

## 2017-09-26 DIAGNOSIS — R60.0 LOCALIZED EDEMA: ICD-10-CM

## 2017-09-26 DIAGNOSIS — I48.91 UNSPECIFIED ATRIAL FIBRILLATION: ICD-10-CM

## 2017-09-26 PROCEDURE — 99215 OFFICE O/P EST HI 40 MIN: CPT

## 2017-09-26 PROCEDURE — 93000 ELECTROCARDIOGRAM COMPLETE: CPT

## 2017-09-28 ENCOUNTER — APPOINTMENT (OUTPATIENT)
Dept: SURGICAL ONCOLOGY | Facility: CLINIC | Age: 82
End: 2017-09-28
Payer: MEDICARE

## 2017-09-28 ENCOUNTER — APPOINTMENT (OUTPATIENT)
Dept: PULMONOLOGY | Facility: CLINIC | Age: 82
End: 2017-09-28
Payer: MEDICARE

## 2017-09-28 VITALS
WEIGHT: 174 LBS | DIASTOLIC BLOOD PRESSURE: 74 MMHG | RESPIRATION RATE: 16 BRPM | SYSTOLIC BLOOD PRESSURE: 121 MMHG | OXYGEN SATURATION: 95 % | BODY MASS INDEX: 26.37 KG/M2 | HEART RATE: 73 BPM | HEIGHT: 68 IN

## 2017-09-28 VITALS
SYSTOLIC BLOOD PRESSURE: 126 MMHG | RESPIRATION RATE: 16 BRPM | WEIGHT: 186 LBS | OXYGEN SATURATION: 95 % | DIASTOLIC BLOOD PRESSURE: 80 MMHG | HEART RATE: 87 BPM | BODY MASS INDEX: 28.19 KG/M2 | HEIGHT: 68 IN

## 2017-09-28 DIAGNOSIS — C44.622 SQUAMOUS CELL CARCINOMA OF SKIN OF RIGHT UPPER LIMB, INCLUDING SHOULDER: ICD-10-CM

## 2017-09-28 DIAGNOSIS — C44.329 SQUAMOUS CELL CARCINOMA OF SKIN OF OTHER PARTS OF FACE: ICD-10-CM

## 2017-09-28 DIAGNOSIS — J18.9 PNEUMONIA, UNSPECIFIED ORGANISM: ICD-10-CM

## 2017-09-28 PROCEDURE — 99214 OFFICE O/P EST MOD 30 MIN: CPT

## 2017-09-28 PROCEDURE — 94729 DIFFUSING CAPACITY: CPT

## 2017-09-28 PROCEDURE — 94060 EVALUATION OF WHEEZING: CPT

## 2017-09-28 PROCEDURE — 99214 OFFICE O/P EST MOD 30 MIN: CPT | Mod: 25

## 2017-09-28 PROCEDURE — 94727 GAS DIL/WSHOT DETER LNG VOL: CPT

## 2017-09-28 PROCEDURE — 71020: CPT

## 2017-10-03 ENCOUNTER — APPOINTMENT (OUTPATIENT)
Dept: CARDIOLOGY | Facility: CLINIC | Age: 82
End: 2017-10-03

## 2017-10-17 ENCOUNTER — OUTPATIENT (OUTPATIENT)
Dept: OUTPATIENT SERVICES | Facility: HOSPITAL | Age: 82
LOS: 1 days | End: 2017-10-17
Payer: MEDICARE

## 2017-10-17 ENCOUNTER — APPOINTMENT (OUTPATIENT)
Dept: CV DIAGNOSITCS | Facility: HOSPITAL | Age: 82
End: 2017-10-17

## 2017-10-17 DIAGNOSIS — I77.0 ARTERIOVENOUS FISTULA, ACQUIRED: Chronic | ICD-10-CM

## 2017-10-17 DIAGNOSIS — R06.89 OTHER ABNORMALITIES OF BREATHING: ICD-10-CM

## 2017-10-17 PROCEDURE — C8929: CPT

## 2017-10-17 PROCEDURE — 93306 TTE W/DOPPLER COMPLETE: CPT | Mod: 26

## 2017-10-24 ENCOUNTER — APPOINTMENT (OUTPATIENT)
Dept: PULMONOLOGY | Facility: CLINIC | Age: 82
End: 2017-10-24
Payer: MEDICARE

## 2017-10-24 VITALS
DIASTOLIC BLOOD PRESSURE: 80 MMHG | BODY MASS INDEX: 29.03 KG/M2 | HEIGHT: 67 IN | OXYGEN SATURATION: 94 % | WEIGHT: 185 LBS | HEART RATE: 105 BPM | SYSTOLIC BLOOD PRESSURE: 128 MMHG

## 2017-10-24 DIAGNOSIS — J39.8 OTHER SPECIFIED DISEASES OF UPPER RESPIRATORY TRACT: ICD-10-CM

## 2017-10-24 PROCEDURE — 99214 OFFICE O/P EST MOD 30 MIN: CPT | Mod: 25

## 2017-10-24 PROCEDURE — 94620 PULMONARY STRESS TESTING SIMPLE: CPT

## 2017-12-04 ENCOUNTER — APPOINTMENT (OUTPATIENT)
Dept: RADIATION ONCOLOGY | Facility: CLINIC | Age: 82
End: 2017-12-04

## 2018-01-11 ENCOUNTER — APPOINTMENT (OUTPATIENT)
Dept: SURGICAL ONCOLOGY | Facility: CLINIC | Age: 83
End: 2018-01-11

## 2018-01-25 ENCOUNTER — APPOINTMENT (OUTPATIENT)
Dept: PULMONOLOGY | Facility: CLINIC | Age: 83
End: 2018-01-25

## 2018-03-27 ENCOUNTER — NON-APPOINTMENT (OUTPATIENT)
Age: 83
End: 2018-03-27

## 2018-03-27 ENCOUNTER — APPOINTMENT (OUTPATIENT)
Dept: CARDIOLOGY | Facility: CLINIC | Age: 83
End: 2018-03-27
Payer: MEDICARE

## 2018-03-27 VITALS
WEIGHT: 180 LBS | HEART RATE: 81 BPM | OXYGEN SATURATION: 92 % | SYSTOLIC BLOOD PRESSURE: 90 MMHG | DIASTOLIC BLOOD PRESSURE: 60 MMHG | BODY MASS INDEX: 28.25 KG/M2 | HEIGHT: 67 IN

## 2018-03-27 VITALS — OXYGEN SATURATION: 96 %

## 2018-03-27 VITALS — SYSTOLIC BLOOD PRESSURE: 116 MMHG | DIASTOLIC BLOOD PRESSURE: 66 MMHG

## 2018-03-27 VITALS — OXYGEN SATURATION: 88 %

## 2018-03-27 DIAGNOSIS — R06.89 OTHER ABNORMALITIES OF BREATHING: ICD-10-CM

## 2018-03-27 PROCEDURE — 99215 OFFICE O/P EST HI 40 MIN: CPT

## 2018-03-27 PROCEDURE — 93000 ELECTROCARDIOGRAM COMPLETE: CPT

## 2018-03-29 ENCOUNTER — APPOINTMENT (OUTPATIENT)
Dept: PULMONOLOGY | Facility: CLINIC | Age: 83
End: 2018-03-29
Payer: MEDICARE

## 2018-03-29 VITALS
OXYGEN SATURATION: 93 % | HEART RATE: 79 BPM | HEIGHT: 67 IN | BODY MASS INDEX: 28.25 KG/M2 | SYSTOLIC BLOOD PRESSURE: 130 MMHG | DIASTOLIC BLOOD PRESSURE: 80 MMHG | WEIGHT: 180 LBS

## 2018-03-29 DIAGNOSIS — J61 PNEUMOCONIOSIS DUE TO ASBESTOS AND OTHER MINERAL FIBERS: ICD-10-CM

## 2018-03-29 DIAGNOSIS — I42.9 CARDIOMYOPATHY, UNSPECIFIED: ICD-10-CM

## 2018-03-29 DIAGNOSIS — G47.33 OBSTRUCTIVE SLEEP APNEA (ADULT) (PEDIATRIC): ICD-10-CM

## 2018-03-29 DIAGNOSIS — R93.8 ABNORMAL FINDINGS ON DIAGNOSTIC IMAGING OF OTHER SPECIFIED BODY STRUCTURES: ICD-10-CM

## 2018-03-29 DIAGNOSIS — J44.9 CHRONIC OBSTRUCTIVE PULMONARY DISEASE, UNSPECIFIED: ICD-10-CM

## 2018-03-29 DIAGNOSIS — R09.02 HYPOXEMIA: ICD-10-CM

## 2018-03-29 DIAGNOSIS — J30.9 ALLERGIC RHINITIS, UNSPECIFIED: ICD-10-CM

## 2018-03-29 DIAGNOSIS — R06.02 SHORTNESS OF BREATH: ICD-10-CM

## 2018-03-29 DIAGNOSIS — J45.909 UNSPECIFIED ASTHMA, UNCOMPLICATED: ICD-10-CM

## 2018-03-29 PROCEDURE — 94729 DIFFUSING CAPACITY: CPT

## 2018-03-29 PROCEDURE — 94618 PULMONARY STRESS TESTING: CPT

## 2018-03-29 PROCEDURE — 99214 OFFICE O/P EST MOD 30 MIN: CPT | Mod: 25

## 2018-03-29 PROCEDURE — 94010 BREATHING CAPACITY TEST: CPT | Mod: 59

## 2018-03-29 RX ORDER — LIDOCAINE 5 G/100G
5 OINTMENT TOPICAL
Qty: 200 | Refills: 0 | Status: DISCONTINUED | COMMUNITY
Start: 2018-01-24

## 2018-06-07 ENCOUNTER — APPOINTMENT (OUTPATIENT)
Dept: PULMONOLOGY | Facility: CLINIC | Age: 83
End: 2018-06-07

## 2018-09-03 PROBLEM — R60.0 BILATERAL EDEMA OF LOWER EXTREMITY: Status: ACTIVE | Noted: 2017-09-26
